# Patient Record
Sex: MALE | Race: WHITE | NOT HISPANIC OR LATINO | Employment: STUDENT | URBAN - METROPOLITAN AREA
[De-identification: names, ages, dates, MRNs, and addresses within clinical notes are randomized per-mention and may not be internally consistent; named-entity substitution may affect disease eponyms.]

---
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
## 2021-02-20 ENCOUNTER — HOSPITAL ENCOUNTER (EMERGENCY)
Facility: HOSPITAL | Age: 21
Discharge: HOME | End: 2021-02-20
Attending: EMERGENCY MEDICINE | Admitting: EMERGENCY MEDICINE
Payer: COMMERCIAL

## 2021-02-20 VITALS
RESPIRATION RATE: 18 BRPM | OXYGEN SATURATION: 99 % | HEART RATE: 79 BPM | SYSTOLIC BLOOD PRESSURE: 122 MMHG | TEMPERATURE: 97.4 F | DIASTOLIC BLOOD PRESSURE: 57 MMHG

## 2021-02-20 DIAGNOSIS — L02.519 ABSCESS OF FINGER: Primary | ICD-10-CM

## 2021-02-20 PROCEDURE — 99283 EMERGENCY DEPT VISIT LOW MDM: CPT

## 2021-02-20 PROCEDURE — 99284 EMERGENCY DEPT VISIT MOD MDM: CPT | Performed by: EMERGENCY MEDICINE

## 2021-02-20 PROCEDURE — 26010 DRAINAGE OF FINGER ABSCESS: CPT | Performed by: EMERGENCY MEDICINE

## 2021-02-20 RX ORDER — SULFAMETHOXAZOLE AND TRIMETHOPRIM 800; 160 MG/1; MG/1
1 TABLET ORAL 2 TIMES DAILY
Qty: 14 TABLET | Refills: 0 | Status: SHIPPED | OUTPATIENT
Start: 2021-02-20 | End: 2021-02-27

## 2021-02-20 RX ORDER — LIDOCAINE HYDROCHLORIDE 10 MG/ML
5 INJECTION, SOLUTION EPIDURAL; INFILTRATION; INTRACAUDAL; PERINEURAL ONCE
Status: COMPLETED | OUTPATIENT
Start: 2021-02-20 | End: 2021-02-20

## 2021-02-20 RX ADMIN — LIDOCAINE HYDROCHLORIDE 5 ML: 10 INJECTION, SOLUTION EPIDURAL; INFILTRATION; INTRACAUDAL; PERINEURAL at 16:56

## 2021-02-20 NOTE — ED PROVIDER NOTES
History  Chief Complaint   Patient presents with    Finger Swelling     pt reports swelling to middle left finger, 2 days prior to arrival  Pt reports having stiches to finger prior unsure of when     HPI     23 yo M with no significant medical history who presents for evaluation of right middle finger swelling  Patient states he had a laceration to the finger approximately one month ago which was repaired with sutures  He states the laceration was completely healed  Approximately 2 days ago, he started to have increased swelling over the dorsal aspect of the right middle finger over the proximal phalanx  Denies any new injuries  No drainage from the wound  Denies any motor or sensory changes in the hand  Denies significant pain  No swelling of the distal finger or pain on the palmar aspect of the hand  No fevers, chills, nausea, vomiting, diarrhea or any other symptoms  Denies any tobacco use, alcohol use or recreational drug use  Prior to Admission Medications   Prescriptions Last Dose Informant Patient Reported? Taking?   ketorolac (TORADOL) 10 mg tablet Not Taking at Unknown time  No No   Sig: Take 1 tablet (10 mg total) by mouth every 6 (six) hours as needed for moderate pain   Patient not taking: Reported on 2/20/2021      Facility-Administered Medications: None       History reviewed  No pertinent past medical history  History reviewed  No pertinent surgical history  History reviewed  No pertinent family history  I have reviewed and agree with the history as documented  E-Cigarette/Vaping    E-Cigarette Use Never User      E-Cigarette/Vaping Substances     Social History     Tobacco Use    Smoking status: Never Smoker    Smokeless tobacco: Never Used   Substance Use Topics    Alcohol use: Never     Frequency: Never    Drug use: Never        Review of Systems   Constitutional: Negative for chills, fatigue and fever  HENT: Negative  Eyes: Negative for visual disturbance     Respiratory: Negative for shortness of breath  Cardiovascular: Negative for chest pain  Gastrointestinal: Negative for abdominal pain, diarrhea, nausea and vomiting  Genitourinary: Negative  Musculoskeletal: Negative for arthralgias and myalgias  Skin: Positive for wound  Negative for color change, pallor and rash  Neurological: Negative for weakness and numbness  All other systems reviewed and are negative  Physical Exam  ED Triage Vitals [02/20/21 1625]   Temperature Pulse Respirations Blood Pressure SpO2   (!) 97 4 °F (36 3 °C) 79 18 122/57 99 %      Temp Source Heart Rate Source Patient Position - Orthostatic VS BP Location FiO2 (%)   Oral -- Sitting Right arm --      Pain Score       6             Orthostatic Vital Signs  Vitals:    02/20/21 1625   BP: 122/57   Pulse: 79   Patient Position - Orthostatic VS: Sitting       Physical Exam  Vitals signs and nursing note reviewed  Constitutional:       General: He is not in acute distress  Appearance: Normal appearance  He is well-developed and normal weight  He is not ill-appearing, toxic-appearing or diaphoretic  HENT:      Head: Normocephalic and atraumatic  Nose: Nose normal       Mouth/Throat:      Mouth: Mucous membranes are moist       Pharynx: Oropharynx is clear  Eyes:      Conjunctiva/sclera: Conjunctivae normal       Pupils: Pupils are equal, round, and reactive to light  Neck:      Musculoskeletal: Neck supple  Cardiovascular:      Rate and Rhythm: Normal rate and regular rhythm  Pulses: Normal pulses  Heart sounds: Normal heart sounds  No murmur  No friction rub  No gallop  Pulmonary:      Effort: Pulmonary effort is normal  No respiratory distress  Breath sounds: Normal breath sounds  No wheezing or rales  Abdominal:      General: There is no distension  Palpations: Abdomen is soft  Tenderness: There is no abdominal tenderness  Musculoskeletal:         General: No swelling or tenderness  Right lower leg: No edema  Left lower leg: No edema  Comments: Able to completely flex and extend fingers of the right hand  Skin:     General: Skin is warm and dry  Capillary Refill: Capillary refill takes less than 2 seconds  Coloration: Skin is not pale  Findings: No erythema or rash  Comments: Swelling and erythema over the dorsal proximal phalanx on the right middle finger with underlying fluctuance  Non-tender  No fusiform swelling of the finger  No tenderness over the flexor tendon  Neurological:      General: No focal deficit present  Mental Status: He is alert and oriented to person, place, and time  Cranial Nerves: No cranial nerve deficit  Sensory: No sensory deficit  Motor: No weakness  Comments: Sensation and motor function intact in the radial, median and ulnar nerve distribution of the hand  Psychiatric:         Mood and Affect: Mood normal          Behavior: Behavior normal          ED Medications  Medications   lidocaine (PF) (XYLOCAINE-MPF) 1 % injection 5 mL (5 mL Infiltration Given by Other 2/20/21 1345)       Diagnostic Studies  Results Reviewed     None                 No orders to display         Procedures  Incision and drain    Date/Time: 2/20/2021 4:50 PM  Performed by: Cherry Higuera MD  Authorized by: Cherry Higuera MD   Universal Protocol:  Procedure performed by: (Dr Ford Soto)  Consent: Verbal consent obtained  Risks and benefits: risks, benefits and alternatives were discussed  Consent given by: patient  Patient understanding: patient states understanding of the procedure being performed  Patient identity confirmed: verbally with patient      Patient location:  ED  Location:     Type:  Abscess    Size:  2 cm x 1 cm    Location:  Upper extremity    Upper extremity location:  R long finger  Pre-procedure details:     Skin preparation:  Alcohol prep  Anesthesia (see MAR for exact dosages):      Anesthesia method:  Nerve block    Block location:  Right middle finger ring block    Block needle gauge:  25 G    Block anesthetic:  Lidocaine 1% w/o epi    Block injection procedure:  Anatomic landmarks identified, introduced needle, incremental injection, anatomic landmarks palpated and negative aspiration for blood    Block outcome:  Anesthesia achieved  Procedure details:     Complexity:  Simple    Needle aspiration: no      Incision types:  Stab incision    Scalpel blade:  11    Approach:  Open    Incision depth:  Subcutaneous    Wound management:  Probed and deloculated    Drainage:  Purulent    Drainage amount: Moderate    Wound treatment:  Wound left open    Packing materials:  None  Post-procedure details:     Patient tolerance of procedure: Tolerated well, no immediate complications          ED Course                                       MDM     25 yo M with no significant medical history who presents for evaluation of finger swelling over the past 2-3 days  No new injury to the finger  Swelling over the dorsal proximal phalanx of the right middle finger with underlying fluctuance  No systemic symptoms  Vitals normal    Differential diagnosis includes:abscess, cellulitis  No signs or symptoms of flexor tenosynovitis  POCUS shows discrete abscess pocket  Will I and D abscess  Will perform digital block prior to I and D  Patient tolerated I and D well, drained moderate amount of purulent drainage  Will start patient on bactrim BID for 7 days for possible surrounding cellulitis  Discussed return precautions including worsening swelling or pain in the finger or development of fevers/chills  Patient was instructed to follow up with his PCP within 1-2 days  Patient expressed understanding and was agreeable for discharge         Disposition  Final diagnoses:   Abscess of finger     Time reflects when diagnosis was documented in both MDM as applicable and the Disposition within this note     Time User Action Codes Description Comment 2/20/2021  5:09 PM Shima Oropeza Add [L02 519] Abscess of finger       ED Disposition     ED Disposition Condition Date/Time Comment    Discharge Stable Sat Feb 20, 2021  5:09 PM Caden Caballero discharge to home/self care  Follow-up Information     Follow up With Specialties Details Why Contact Tunde Flores DO Family Medicine Schedule an appointment as soon as possible for a visit in 2 days  [address]  [phone]            Discharge Medication List as of 2/20/2021  5:11 PM      START taking these medications    Details   sulfamethoxazole-trimethoprim (BACTRIM DS) 800-160 mg per tablet Take 1 tablet by mouth 2 (two) times a day for 7 days smx-tmp DS (BACTRIM) 800-160 mg tabs (1tab q12 D10), Starting Sat 2/20/2021, Until Sat 2/27/2021, Normal         CONTINUE these medications which have NOT CHANGED    Details   ketorolac (TORADOL) 10 mg tablet Take 1 tablet (10 mg total) by mouth every 6 (six) hours as needed for moderate pain, Starting Thu 8/6/2020, Normal           No discharge procedures on file  PDMP Review     None           ED Provider  Attending physically available and evaluated Caden Caballero I managed the patient along with the ED Attending      Electronically Signed by         Xochitl Edmond MD  02/21/21 0886

## 2021-02-20 NOTE — ED ATTENDING ATTESTATION
Final Diagnosis:  1  Abscess of finger           I, Evaristo Montgomery MD, saw and evaluated the patient  All available labs and X-rays were ordered by me or the resident and have been reviewed by myself  I discussed the patient with the resident / non-physician and agree with the resident's / non-physician practitioner's findings and plan as documented in the resident's / non-physician practicitioner's note, except where noted  At this point, I agree with the current assessment done in the ED  I was present during key portions of all procedures performed unless otherwise stated  Chief Complaint   Patient presents with    Finger Swelling     pt reports swelling to middle left finger, 2 days prior to arrival  Pt reports having stiches to finger prior unsure of when     This is a 24 y o  male presenting for evaluation of finger swelling x2 days  He had sutures about a month ago, healed fine, but then started to swell up 2 days ago  RIGHT middle figner, above proximal phalanx  No drainage  +pressure  No decreased sensation  Able to fully clsoe hand but there's a pressure sensation  No new injuries     PMH:   has no past medical history on file  PSH:   has no past surgical history on file  Social:  Social History     Substance and Sexual Activity   Alcohol Use Never    Frequency: Never     Social History     Tobacco Use   Smoking Status Never Smoker   Smokeless Tobacco Never Used     Social History     Substance and Sexual Activity   Drug Use Never     PE:  Vitals:    02/20/21 1625   BP: 122/57   BP Location: Right arm   Pulse: 79   Resp: 18   Temp: (!) 97 4 °F (36 3 °C)   TempSrc: Oral   SpO2: 99%   General: VS reviewed  Appears in NAD  awake, alert  Well-nourished, well-developed  Appears stated age  Speaking normally in full sentences  Head: Normocephalic, atraumatic  Eyes: EOM-I  No diplopia  No hyphema  No subconjunctival hemorrhages  Symmetrical lids     ENT: Atraumatic external nose and ears  MMM  No malocclusion  No stridor  Normal phonation  No drooling  Normal swallowing  Neck: No JVD  CV: No pallor noted  Lungs:   No tachypnea  No respiratory distress  MSK:   FROM spontaneously    RIGHT 3rd figner, proximal phalanx, dorsal aspect, has an abscess  Feels fluctuant  Needs I&D  Has small amount of surroundign redness    Skin: Dry, intact  Neuro: Awake, alert, GCS15, CN II-XII grossly intact  Motor grossly intact  Psychiatric/Behavioral: Appropriate mood and affect   Exam: deferred  A:  - Finger pain / swelling ? abscess  P:  - I&D  - bactrim b/c it involves hand    - 13 point ROS was performed and all are normal unless stated in the history above  - Nursing note reviewed  Vitals reviewed  - Orders placed by myself and/or advanced practitioner / resident     - Previous chart was reviewed  - No language barrier    - History obtained from patient  - There are no limitations to the history obtained  - Critical care time: Not applicable for this patient  Code Status: No Order  Advance Directive and Living Will:      Power of :    POLST:      Medications   lidocaine (PF) (XYLOCAINE-MPF) 1 % injection 5 mL (5 mL Infiltration Given by Other 2/20/21 3088)     No orders to display     Orders Placed This Encounter   Procedures    Incision and Drainage     Labs Reviewed - No data to display  Time reflects when diagnosis was documented in both MDM as applicable and the Disposition within this note     Time User Action Codes Description Comment    2/20/2021  5:09 PM Shelby Alvarado Add [L02 519] Abscess of finger       ED Disposition     ED Disposition Condition Date/Time Comment    Discharge Stable Sat Feb 20, 2021  5:09 PM Evaristo Jones discharge to home/self care              Follow-up Information     Follow up With Specialties Details Why Contact Info    Laurey Shone, DO Family Medicine Schedule an appointment as soon as possible for a visit in 2 days  [address] [place] [address] [facility] [address]  [phone]          Discharge Medication List as of 2/20/2021  5:11 PM      START taking these medications    Details   sulfamethoxazole-trimethoprim (BACTRIM DS) 800-160 mg per tablet Take 1 tablet by mouth 2 (two) times a day for 7 days smx-tmp DS (BACTRIM) 800-160 mg tabs (1tab q12 D10), Starting Sat 2/20/2021, Until Sat 2/27/2021, Normal         CONTINUE these medications which have NOT CHANGED    Details   ketorolac (TORADOL) 10 mg tablet Take 1 tablet (10 mg total) by mouth every 6 (six) hours as needed for moderate pain, Starting Thu 8/6/2020, Normal           No discharge procedures on file  Prior to Admission Medications   Prescriptions Last Dose Informant Patient Reported? Taking?   ketorolac (TORADOL) 10 mg tablet Not Taking at Unknown time  No No   Sig: Take 1 tablet (10 mg total) by mouth every 6 (six) hours as needed for moderate pain   Patient not taking: Reported on 2/20/2021      Facility-Administered Medications: None       Portions of the record may have been created with voice recognition software  Occasional wrong word or "sound a like" substitutions may have occurred due to the inherent limitations of voice recognition software  Read the chart carefully and recognize, using context, where substitutions have occurred      Electronically signed by:  Russell Gallego